# Patient Record
Sex: MALE | Race: WHITE | Employment: FULL TIME | URBAN - METROPOLITAN AREA
[De-identification: names, ages, dates, MRNs, and addresses within clinical notes are randomized per-mention and may not be internally consistent; named-entity substitution may affect disease eponyms.]

---

## 2017-03-12 DIAGNOSIS — L65.9 ALOPECIA: Primary | ICD-10-CM

## 2017-03-13 RX ORDER — FINASTERIDE 5 MG/1
TABLET, FILM COATED ORAL
Qty: 90 TABLET | Refills: 0 | Status: SHIPPED | OUTPATIENT
Start: 2017-03-13 | End: 2018-01-13

## 2017-07-18 ENCOUNTER — OFFICE VISIT (OUTPATIENT)
Dept: INTERNAL MEDICINE CLINIC | Facility: CLINIC | Age: 37
End: 2017-07-18

## 2017-07-18 ENCOUNTER — HOSPITAL ENCOUNTER (OUTPATIENT)
Dept: CT IMAGING | Age: 37
Discharge: HOME | End: 2017-07-18
Attending: INTERNAL MEDICINE
Payer: COMMERCIAL

## 2017-07-18 VITALS
TEMPERATURE: 98 F | HEART RATE: 68 BPM | DIASTOLIC BLOOD PRESSURE: 88 MMHG | OXYGEN SATURATION: 97 % | RESPIRATION RATE: 16 BRPM | SYSTOLIC BLOOD PRESSURE: 124 MMHG | WEIGHT: 208 LBS | BODY MASS INDEX: 28.17 KG/M2 | HEIGHT: 72 IN

## 2017-07-18 DIAGNOSIS — R10.31 ABDOMINAL PAIN, RLQ: Primary | ICD-10-CM

## 2017-07-18 DIAGNOSIS — N50.811 TESTICULAR PAIN, RIGHT: ICD-10-CM

## 2017-07-18 DIAGNOSIS — R10.31 ABDOMINAL PAIN, RLQ: ICD-10-CM

## 2017-07-18 PROCEDURE — 74176 CT ABD & PELVIS W/O CONTRAST: CPT | Performed by: INTERNAL MEDICINE

## 2017-07-18 PROCEDURE — 99214 OFFICE O/P EST MOD 30 MIN: CPT | Performed by: INTERNAL MEDICINE

## 2017-07-18 NOTE — PROGRESS NOTES
HPI:    Patient ID: Bety Rodriguez is a 44year old male. Abdominal Pain   This is a new problem. Episode onset: 3 weeks. The onset quality is sudden. The problem occurs constantly. The problem has been resolved. The pain is located in the RLQ.  The pain Abdominal: Soft. Normal appearance. He exhibits no shifting dullness, no distension, no pulsatile liver, no fluid wave, no abdominal bruit and no ascites. There is tenderness in the right lower quadrant.  There is guarding and tenderness at McBurney's point

## 2017-07-18 NOTE — PATIENT INSTRUCTIONS
Abdominal Pain    Abdominal pain is pain in the stomach or belly area. Everyone has this pain from time to time. In many cases it goes away on its own. But abdominal pain can sometimes be due to a serious problem, such as appendicitis.  So it’s important If you have vomiting or diarrhea, sip water or other clear fluids. When you are ready to eat solid foods again, start with small amounts of easy-to-digest, low-fat foods. These include apple sauce, toast, or crackers.    When to seek medical care  Call [phone]

## 2017-10-26 ENCOUNTER — OFFICE VISIT (OUTPATIENT)
Dept: INTERNAL MEDICINE CLINIC | Facility: CLINIC | Age: 37
End: 2017-10-26

## 2017-10-26 VITALS
HEIGHT: 72 IN | TEMPERATURE: 98 F | OXYGEN SATURATION: 95 % | SYSTOLIC BLOOD PRESSURE: 118 MMHG | DIASTOLIC BLOOD PRESSURE: 88 MMHG | BODY MASS INDEX: 28.71 KG/M2 | RESPIRATION RATE: 16 BRPM | WEIGHT: 212 LBS | HEART RATE: 77 BPM

## 2017-10-26 DIAGNOSIS — Z00.00 LABORATORY EXAMINATION ORDERED AS PART OF A ROUTINE GENERAL MEDICAL EXAMINATION: ICD-10-CM

## 2017-10-26 DIAGNOSIS — R41.840 POOR CONCENTRATION: ICD-10-CM

## 2017-10-26 DIAGNOSIS — Z13.0 SCREENING, ANEMIA, DEFICIENCY, IRON: ICD-10-CM

## 2017-10-26 DIAGNOSIS — Z13.29 THYROID DISORDER SCREEN: ICD-10-CM

## 2017-10-26 DIAGNOSIS — Z12.31 ENCOUNTER FOR SCREENING MAMMOGRAM FOR MALIGNANT NEOPLASM OF BREAST: ICD-10-CM

## 2017-10-26 DIAGNOSIS — Z13.220 LIPID SCREENING: ICD-10-CM

## 2017-10-26 DIAGNOSIS — Z13.89 SCREENING FOR GENITOURINARY CONDITION: ICD-10-CM

## 2017-10-26 DIAGNOSIS — Z00.00 PHYSICAL EXAM, ANNUAL: Primary | ICD-10-CM

## 2017-10-26 PROCEDURE — 99395 PREV VISIT EST AGE 18-39: CPT | Performed by: INTERNAL MEDICINE
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
## 2018-01-12 ENCOUNTER — HOSPITAL ENCOUNTER (OUTPATIENT)
Age: 38
Discharge: HOME | End: 2018-01-12
Attending: FAMILY MEDICINE
Payer: COMMERCIAL

## 2018-01-12 ENCOUNTER — APPOINTMENT (OUTPATIENT)
Dept: ULTRASOUND IMAGING | Facility: HOSPITAL | Age: 38
End: 2018-01-12
Attending: FAMILY MEDICINE
Payer: COMMERCIAL

## 2018-01-12 VITALS
WEIGHT: 206 LBS | HEIGHT: 72 IN | TEMPERATURE: 98 F | BODY MASS INDEX: 27.9 KG/M2 | RESPIRATION RATE: 18 BRPM | DIASTOLIC BLOOD PRESSURE: 92 MMHG | SYSTOLIC BLOOD PRESSURE: 140 MMHG | HEART RATE: 94 BPM | OXYGEN SATURATION: 98 %

## 2018-01-12 VITALS
TEMPERATURE: 98 F | SYSTOLIC BLOOD PRESSURE: 145 MMHG | DIASTOLIC BLOOD PRESSURE: 82 MMHG | RESPIRATION RATE: 18 BRPM | HEART RATE: 86 BPM | OXYGEN SATURATION: 96 %

## 2018-01-12 DIAGNOSIS — S89.90XA INJURY OF CALF: Primary | ICD-10-CM

## 2018-01-12 DIAGNOSIS — M25.571 ACUTE RIGHT ANKLE PAIN: ICD-10-CM

## 2018-01-12 DIAGNOSIS — S80.11XA CONTUSION OF RIGHT CALF, INITIAL ENCOUNTER: ICD-10-CM

## 2018-01-12 DIAGNOSIS — S86.011A ACHILLES TENDON RUPTURE, RIGHT, INITIAL ENCOUNTER: Primary | ICD-10-CM

## 2018-01-12 PROCEDURE — 76881 US COMPL JOINT R-T W/IMG: CPT | Performed by: FAMILY MEDICINE

## 2018-01-12 PROCEDURE — 99204 OFFICE O/P NEW MOD 45 MIN: CPT

## 2018-01-12 PROCEDURE — 99214 OFFICE O/P EST MOD 30 MIN: CPT

## 2018-01-12 PROCEDURE — 29515 APPLICATION SHORT LEG SPLINT: CPT

## 2018-01-12 RX ORDER — HYDROCODONE BITARTRATE AND ACETAMINOPHEN 5; 325 MG/1; MG/1
1-2 TABLET ORAL EVERY 6 HOURS PRN
Qty: 20 TABLET | Refills: 0 | Status: SHIPPED | OUTPATIENT
Start: 2018-01-12 | End: 2018-01-15

## 2018-01-12 RX ORDER — HYDROCODONE BITARTRATE AND ACETAMINOPHEN 5; 325 MG/1; MG/1
1 TABLET ORAL ONCE
Status: COMPLETED | OUTPATIENT
Start: 2018-01-12 | End: 2018-01-12

## 2018-01-12 RX ORDER — IBUPROFEN 800 MG/1
800 TABLET ORAL ONCE
Status: COMPLETED | OUTPATIENT
Start: 2018-01-12 | End: 2018-01-12

## 2018-01-12 NOTE — ED INITIAL ASSESSMENT (HPI)
Stepped into whole earlier today. Now cannot bear weight. To right  Foot, pain to achilles  Cms intact. Cydney Lloyd

## 2018-01-13 DIAGNOSIS — L65.9 ALOPECIA: ICD-10-CM

## 2018-01-13 NOTE — ED PROVIDER NOTES
Patient Seen in: [facility]    History   Patient presents with:  Lower Extremity Injury (musculoskeletal)    Stated Complaint: right leg injury     HPI  43 yo M here with complaints of a R leg injury   Cannot bear weight - motion: unable to dorsiflex and plantarflex   - joint laxity: none noted at this time because he is unable to bear weight on the RLE   - crepitus: none noted   - pedal pulses: Intact  - sensations: intact  - ankle joint: normal exam        ED Course   Labs results were available patient was called back in (we discussed this as part of the plan) - returned to get post mold applied and discussed case with Ortho on call Dr Makenzie Andujar who agrees to see him on Monday.            Disposition and Plan     Clinical Impres

## 2018-01-13 NOTE — ED PROVIDER NOTES
Reviewed US results with the patient. Discussed case with Dr Clyde Fleischer on call for Ortho. No MRI needed. No emergent evaluation / management needed. Follow up in 2-3 days on 1/15/2018 advised with post mold and crutches.      Splint applied by RN and yaron

## 2018-01-13 NOTE — ED INITIAL ASSESSMENT (HPI)
Pt arrived alert and responsive. Pt c/o ankle pain and injury. Ultrasound shows tear right achilles tendon. Pt returned here for splint placement.

## 2018-01-15 RX ORDER — FINASTERIDE 5 MG/1
TABLET, FILM COATED ORAL
Qty: 90 TABLET | Refills: 2 | Status: SHIPPED | OUTPATIENT
Start: 2018-01-15

## 2018-01-19 ENCOUNTER — ANESTHESIA (OUTPATIENT)
Dept: SURGERY | Facility: HOSPITAL | Age: 38
End: 2018-01-19
Payer: COMMERCIAL

## 2018-01-19 ENCOUNTER — SURGERY (OUTPATIENT)
Age: 38
End: 2018-01-19

## 2018-01-19 ENCOUNTER — HOSPITAL ENCOUNTER (OUTPATIENT)
Facility: HOSPITAL | Age: 38
Setting detail: HOSPITAL OUTPATIENT SURGERY
Discharge: HOME | End: 2018-01-19
Attending: ORTHOPAEDIC SURGERY | Admitting: ORTHOPAEDIC SURGERY
Payer: COMMERCIAL

## 2018-01-19 ENCOUNTER — ANESTHESIA EVENT (OUTPATIENT)
Dept: SURGERY | Facility: HOSPITAL | Age: 38
End: 2018-01-19
Payer: COMMERCIAL

## 2018-01-19 VITALS
DIASTOLIC BLOOD PRESSURE: 68 MMHG | HEART RATE: 62 BPM | WEIGHT: 206 LBS | OXYGEN SATURATION: 97 % | HEIGHT: 72 IN | RESPIRATION RATE: 18 BRPM | BODY MASS INDEX: 27.9 KG/M2 | SYSTOLIC BLOOD PRESSURE: 106 MMHG | TEMPERATURE: 98 F

## 2018-01-19 PROCEDURE — 0LQN0ZZ REPAIR RIGHT LOWER LEG TENDON, OPEN APPROACH: ICD-10-PCS | Performed by: ORTHOPAEDIC SURGERY

## 2018-01-19 PROCEDURE — 76942 ECHO GUIDE FOR BIOPSY: CPT | Performed by: ORTHOPAEDIC SURGERY

## 2018-01-19 RX ORDER — HYDROCODONE BITARTRATE AND ACETAMINOPHEN 10; 325 MG/1; MG/1
1 TABLET ORAL AS NEEDED
Status: DISCONTINUED | OUTPATIENT
Start: 2018-01-19 | End: 2018-01-19

## 2018-01-19 RX ORDER — SODIUM CHLORIDE, SODIUM LACTATE, POTASSIUM CHLORIDE, CALCIUM CHLORIDE 600; 310; 30; 20 MG/100ML; MG/100ML; MG/100ML; MG/100ML
INJECTION, SOLUTION INTRAVENOUS CONTINUOUS
Status: DISCONTINUED | OUTPATIENT
Start: 2018-01-19 | End: 2018-01-19

## 2018-01-19 RX ORDER — MEPERIDINE HYDROCHLORIDE 25 MG/ML
12.5 INJECTION INTRAMUSCULAR; INTRAVENOUS; SUBCUTANEOUS AS NEEDED
Status: DISCONTINUED | OUTPATIENT
Start: 2018-01-19 | End: 2018-01-19

## 2018-01-19 RX ORDER — HYDROCODONE BITARTRATE AND ACETAMINOPHEN 10; 325 MG/1; MG/1
1-2 TABLET ORAL
Qty: 80 TABLET | Refills: 0 | Status: SHIPPED | OUTPATIENT
Start: 2018-01-19

## 2018-01-19 RX ORDER — TRAMADOL HYDROCHLORIDE 50 MG/1
100 TABLET ORAL EVERY 6 HOURS PRN
COMMUNITY

## 2018-01-19 RX ORDER — SCOLOPAMINE TRANSDERMAL SYSTEM 1 MG/1
PATCH, EXTENDED RELEASE TRANSDERMAL
Status: DISCONTINUED
Start: 2018-01-19 | End: 2018-01-19

## 2018-01-19 RX ORDER — ONDANSETRON 2 MG/ML
4 INJECTION INTRAMUSCULAR; INTRAVENOUS AS NEEDED
Status: DISCONTINUED | OUTPATIENT
Start: 2018-01-19 | End: 2018-01-19

## 2018-01-19 RX ORDER — HYDROMORPHONE HYDROCHLORIDE 1 MG/ML
0.4 INJECTION, SOLUTION INTRAMUSCULAR; INTRAVENOUS; SUBCUTANEOUS EVERY 5 MIN PRN
Status: DISCONTINUED | OUTPATIENT
Start: 2018-01-19 | End: 2018-01-19

## 2018-01-19 RX ORDER — CLINDAMYCIN PHOSPHATE 900 MG/50ML
900 INJECTION INTRAVENOUS ONCE
Status: DISCONTINUED | OUTPATIENT
Start: 2018-01-19 | End: 2018-01-19 | Stop reason: HOSPADM

## 2018-01-19 RX ORDER — CLINDAMYCIN PHOSPHATE 900 MG/50ML
INJECTION INTRAVENOUS
Status: DISCONTINUED | OUTPATIENT
Start: 2018-01-19 | End: 2018-01-19

## 2018-01-19 RX ORDER — METOCLOPRAMIDE HYDROCHLORIDE 5 MG/ML
10 INJECTION INTRAMUSCULAR; INTRAVENOUS AS NEEDED
Status: DISCONTINUED | OUTPATIENT
Start: 2018-01-19 | End: 2018-01-19

## 2018-01-19 RX ORDER — NALOXONE HYDROCHLORIDE 0.4 MG/ML
80 INJECTION, SOLUTION INTRAMUSCULAR; INTRAVENOUS; SUBCUTANEOUS AS NEEDED
Status: DISCONTINUED | OUTPATIENT
Start: 2018-01-19 | End: 2018-01-19

## 2018-01-19 RX ORDER — SCOLOPAMINE TRANSDERMAL SYSTEM 1 MG/1
1 PATCH, EXTENDED RELEASE TRANSDERMAL ONCE
Status: DISCONTINUED | OUTPATIENT
Start: 2018-01-19 | End: 2018-01-19

## 2018-01-19 RX ORDER — HYDROCODONE BITARTRATE AND ACETAMINOPHEN 10; 325 MG/1; MG/1
2 TABLET ORAL AS NEEDED
Status: DISCONTINUED | OUTPATIENT
Start: 2018-01-19 | End: 2018-01-19

## 2018-01-19 NOTE — H&P
[address]    PATIENT'S NAME: Raymon Connell   ATTENDING PHYSICIAN: Florencia Gonsales M.D.    PATIENT ACCOUNT#:   [de-identified]    LOCATION:  OR   [facility]  MEDICAL RECORD #:   [id number]       YOB: 1980  ADMISSION DATE:       01/19/2018    HISTOR blood pressure 132/90. HEENT:  Unremarkable. LUNGS:  Clear. HEART:  Normal S1, S2 without murmur. ABDOMEN:  Benign.   EXTREMITIES:  Significant for an obvious Franco test which is positive with a palpable gap in the area of the Achilles tendon on the

## 2018-01-19 NOTE — INTERVAL H&P NOTE
Pre-op Diagnosis: RUPTURE OF RIGHT ACHILLES TENDON     The above referenced H&P was reviewed by Sancho Jorge NP on 1/19/2018, the patient was examined and no significant changes have occurred in the patient's condition since the H&P was performed.   I dis

## 2018-01-19 NOTE — ANESTHESIA PREPROCEDURE EVALUATION
PRE-OP EVALUATION    Patient Name: Bety Rodriguez    Pre-op Diagnosis: RUPTURE OF RIGHT ACHILLES TENDON     Procedure(s):  RIGHT ACHILLES TENDON REPAIR      Surgeon(s) and Role:     Carla Torrez MD - Primary    Pre-op vitals reviewed.   Temp: 97.6 °F (3 auscultation bilaterally. Other findings            ASA: 2   Plan: general and regional  NPO status verified and patient meets guidelines.           Plan/risks discussed with: patient and spouse                Present on Admission:  **None**

## 2018-01-19 NOTE — BRIEF OP NOTE
Pre-Operative Diagnosis: RUPTURE OF RIGHT ACHILLES TENDON      Post-Operative Diagnosis: same as pre-op     Procedure Performed:   Procedure(s):  RIGHT ACHILLES TENDON REPAIR      Surgeon(s) and Role:     Josephine Lau MD - Primary    Assistant(s):

## 2018-01-20 NOTE — ANESTHESIA POSTPROCEDURE EVALUATION
[address] [facility] Patient Status:  Hospital Outpatient Surgery   Age/Gender 44year old male MRN [id number]   [facility] SURGERY Attending Jennifer Lantigua MD   Hosp Day # 0 PCP Stormy Rivers MD       Anesthesia Post-op Note    P

## 2018-01-21 PROBLEM — S86.011A RUPTURE OF RIGHT ACHILLES TENDON, INITIAL ENCOUNTER: Status: ACTIVE | Noted: 2018-01-21

## 2018-01-21 NOTE — OPERATIVE REPORT
[facility]    PATIENT'S NAME: Steve Hoffman   ATTENDING PHYSICIAN: Dyllan Chanel M.D. OPERATING PHYSICIAN: Dyllan Chanel M.D.    PATIENT ACCOUNT#:   [de-identified]    LOCATION:  PREOP[facility]CC  PRE ASCC 1 EDWP 10  MEDICAL RECORD #:   [id number]       DATE with #1 Ethibond suture. An excellent watertight seal was achieved. The wound was irrigated. Deep tissues were closed with 2-0 Vicryl suture. Skin was closed with staples. Adaptic and a sterile dressing were applied.   A short leg equinus cast was appl

## 2018-01-29 PROBLEM — Z09 POSTOPERATIVE EXAMINATION: Status: ACTIVE | Noted: 2018-01-29

## 2018-03-12 ENCOUNTER — HOSPITAL ENCOUNTER (OUTPATIENT)
Dept: PHYSICAL THERAPY | Facility: HOSPITAL | Age: 38
Setting detail: THERAPIES SERIES
Discharge: HOME | End: 2018-03-12
Attending: ORTHOPAEDIC SURGERY
Payer: COMMERCIAL

## 2018-03-12 DIAGNOSIS — Z09 POSTOPERATIVE EXAMINATION: ICD-10-CM

## 2018-03-12 DIAGNOSIS — Z98.890 S/P ACHILLES TENDON REPAIR: ICD-10-CM

## 2018-03-12 PROCEDURE — 97161 PT EVAL LOW COMPLEX 20 MIN: CPT

## 2018-03-12 PROCEDURE — 97110 THERAPEUTIC EXERCISES: CPT

## 2018-03-12 NOTE — PROGRESS NOTES
LOWER EXTREMITY EVALUATION:   Referring Physician: Dr. Clyde Fleischer  Diagnosis: Achilles tendon rupture and repair     Date of Service: 3/12/2018     PATIENT SUMMARY   Ingrid Abrams is a 43year old male who presents to therapy today s/p achilles tendon repair o Complexity decision making due to 1+ personal factors/comorbidities, 3+ body structures involved/activity limitations, and stable symptoms.   Prognosis for therapy is good as patient readily participates in physical therapy eval and is highly motivated to r L 4+/5   Flexion: R 5/5; L 5/5  Extension: R 5/5; L 5/5    DF: R NT/5; L 5/5  PF: R NT/5; L 5/5 (completes 25 heel raises)  INV: R NT/5; L 5/5  EV: R NT/5; L 5/5   *will assess R ankle/foot strength when apprpriate    Today’s Treatment and Response:   Eval None  Rehab Potential:good    FOTO: 53/100    Patient/Family/Caregiver was advised of these findings, precautions, and treatment options and has agreed to actively participate in planning and for this course of care.     Thank you for your referral. Please

## 2018-03-16 ENCOUNTER — HOSPITAL ENCOUNTER (OUTPATIENT)
Dept: PHYSICAL THERAPY | Facility: HOSPITAL | Age: 38
Setting detail: THERAPIES SERIES
Discharge: HOME | End: 2018-03-16
Attending: ORTHOPAEDIC SURGERY
Payer: COMMERCIAL

## 2018-03-16 PROCEDURE — 97140 MANUAL THERAPY 1/> REGIONS: CPT

## 2018-03-16 PROCEDURE — 97110 THERAPEUTIC EXERCISES: CPT

## 2018-03-16 NOTE — PROGRESS NOTES
Dx: s/p achilles tendon repair         Authorized # of Visits:  12         Next MD visit: none scheduled  Fall Risk: standard         Precautions: n/a             Subjective: Patient states he is still experiencing pain right before bed but it hasn't been 3/   Date:               TX#: 4/ Date:               TX#: 5/ Date:               TX#: 6/ Date:               TX#: 7/ Date:               TX#: 8/   Upright bike L2 x 5 min         Passive self DF stretch 4 x 30s         RTB  -Eversion 10  -Inversion 2 x 10

## 2018-03-16 NOTE — ADDENDUM NOTE
Encounter addended by: Philmore Boas, PT on: 3/16/2018  9:49 AM<BR>    Actions taken: Pend clinical note, Sign clinical note

## 2018-03-19 ENCOUNTER — HOSPITAL ENCOUNTER (OUTPATIENT)
Dept: PHYSICAL THERAPY | Facility: HOSPITAL | Age: 38
Setting detail: THERAPIES SERIES
Discharge: HOME | End: 2018-03-19
Attending: ORTHOPAEDIC SURGERY
Payer: COMMERCIAL

## 2018-03-19 PROCEDURE — 97140 MANUAL THERAPY 1/> REGIONS: CPT

## 2018-03-19 PROCEDURE — 97530 THERAPEUTIC ACTIVITIES: CPT

## 2018-03-19 NOTE — PROGRESS NOTES
Dx: s/p achilles tendon repair         Authorized # of Visits:  12         Next MD visit: none scheduled  Fall Risk: standard         Precautions: n/a             Subjective: Patient reports that he is not experiencing any pain other than itching around th performing light jogging, squatting, side shuffling as indicated by MD restrictions for eventual return to sport. -progress  · Pt will be independent and compliant with comprehensive HEP tgo maintain progress achieved in Pt. -issued    Plan: Continue addre

## 2018-03-20 NOTE — ADDENDUM NOTE
Encounter addended by: Young Rudd PT on: 3/19/2018  8:53 PM<BR>    Actions taken: Sign clinical note

## 2018-03-21 ENCOUNTER — HOSPITAL ENCOUNTER (OUTPATIENT)
Dept: PHYSICAL THERAPY | Facility: HOSPITAL | Age: 38
Setting detail: THERAPIES SERIES
Discharge: HOME | End: 2018-03-21
Attending: ORTHOPAEDIC SURGERY
Payer: COMMERCIAL

## 2018-03-21 PROCEDURE — 97140 MANUAL THERAPY 1/> REGIONS: CPT

## 2018-03-21 PROCEDURE — 97110 THERAPEUTIC EXERCISES: CPT

## 2018-03-21 NOTE — PROGRESS NOTES
Dx: s/p achilles tendon repair         Authorized # of Visits:  12         Next MD visit: none scheduled  Fall Risk: standard         Precautions: n/a             Subjective: Patient reports feeling really good and 'almost fully healed'.  He hasn't been exp -progress  · Pt will be independent and compliant with comprehensive HEP tgo maintain progress achieved in Pt. -issued    Plan: Continue addressing joint hypomobility, progress therex and stretching as indicated.     Date: 3/16/2018 TX#: 2/12 Date: 3/19/18 ankle ABC's, ankle pumps, prone glue max, side lying hip abduction, towel toe curls    Charges: manual x 1, therex x 2       Total Timed Treatment: 45 min  Total Treatment Time: 45 min

## 2018-03-26 ENCOUNTER — APPOINTMENT (OUTPATIENT)
Dept: PHYSICAL THERAPY | Facility: HOSPITAL | Age: 38
End: 2018-03-26
Attending: ORTHOPAEDIC SURGERY
Payer: COMMERCIAL

## 2018-03-30 ENCOUNTER — APPOINTMENT (OUTPATIENT)
Dept: PHYSICAL THERAPY | Facility: HOSPITAL | Age: 38
End: 2018-03-30
Attending: ORTHOPAEDIC SURGERY
Payer: COMMERCIAL

## 2018-04-02 ENCOUNTER — HOSPITAL ENCOUNTER (OUTPATIENT)
Dept: PHYSICAL THERAPY | Facility: HOSPITAL | Age: 38
Setting detail: THERAPIES SERIES
Discharge: HOME | End: 2018-04-02
Attending: ORTHOPAEDIC SURGERY
Payer: COMMERCIAL

## 2018-04-02 PROCEDURE — 97140 MANUAL THERAPY 1/> REGIONS: CPT

## 2018-04-02 PROCEDURE — 97110 THERAPEUTIC EXERCISES: CPT

## 2018-04-02 NOTE — PROGRESS NOTES
Dx: s/p achilles tendon repair         Authorized # of Visits:  12         Next MD visit: none scheduled  Fall Risk: standard         Precautions: n/a             Subjective: Patient reports that MD removed all restrictions other than jumping and running ( after next session. Issue comprehensive updated HEP.   Date: 3/16/2018 TX#: 2/12 Date: 3/19/18    TX#: 3/12   Date: 3/21/18    TX#: 4/12 Date: 4/2/18      TX#: 5/12 Date:               TX#: 6/ Date:               TX#: 7/ Date:               TX#: Kp/   Alida Pruitt distraction, grade III  Passive gastroc stretch  Scar mobs x 5 min      Skilled Services: Education on protecting integrity of tendon and surgery, therex to improve strength and A/PROM deficits as tolorated and appropriate, manual therapy to improve arthro

## 2018-04-03 NOTE — ADDENDUM NOTE
Encounter addended by: Nicole Pugh PT on: 4/3/2018  9:06 AM<BR>    Actions taken: Pend clinical note, Sign clinical note

## 2018-04-04 ENCOUNTER — HOSPITAL ENCOUNTER (OUTPATIENT)
Dept: PHYSICAL THERAPY | Facility: HOSPITAL | Age: 38
Setting detail: THERAPIES SERIES
Discharge: HOME | End: 2018-04-04
Attending: ORTHOPAEDIC SURGERY
Payer: COMMERCIAL

## 2018-04-04 PROCEDURE — 97140 MANUAL THERAPY 1/> REGIONS: CPT

## 2018-04-04 PROCEDURE — 97110 THERAPEUTIC EXERCISES: CPT

## 2018-04-04 NOTE — PROGRESS NOTES
Dx: s/p achilles tendon repair         Authorized # of Visits:  12         Next MD visit: none scheduled  Fall Risk: standard         Precautions: n/a             Subjective: Patient reports  No c/o pain and swelling had decreased since last visit.   Santos from PT x 3 weeks with continued independent HEP, will follow-up at that time (April 23). Continue addressing joint hypomobility with mobs and stretching. Progress therex as appropriate. Assess goals, ROM, strength and update HEP at follow up.    Date: 3/16 difficulty)  -85-90% WB heel raise R x 10 -85-90% WB heel raise R x 20  -double heel rise with R eccentric lowering x 8 (max difficulty)     -med/lat subtalar mobs, grade II/III  -ant/post talocrural mobs, grade II/III  -scar mobs x 3 min -med/lat subtalar

## 2018-04-23 ENCOUNTER — HOSPITAL ENCOUNTER (OUTPATIENT)
Dept: PHYSICAL THERAPY | Facility: HOSPITAL | Age: 38
Setting detail: THERAPIES SERIES
Discharge: HOME | End: 2018-04-23
Attending: ORTHOPAEDIC SURGERY
Payer: COMMERCIAL

## 2018-04-23 PROCEDURE — 97140 MANUAL THERAPY 1/> REGIONS: CPT

## 2018-04-23 PROCEDURE — 97110 THERAPEUTIC EXERCISES: CPT

## 2018-04-23 NOTE — PROGRESS NOTES
Dx: s/p achilles tendon repair         Authorized # of Visits:  12         Next MD visit: none scheduled  Fall Risk: standard         Precautions: n/a    Subjective: Patient reports minimal changes since last therapy visit.   Patient reports that he has not Progress PF strength as indicated and appropriate.    Date: 3/16/2018 TX#: 2/12 Date: 3/19/18    TX#: 3/12   Date: 3/21/18    TX#: 4/12 Date: 4/2/18      TX#: 5/12 Date:               TX#: 6/12 Date: 4/23/18     TX#: 7/12 Date:               TX#: Marisol Spangler (max difficulty)  -85-90% WB heel raise R x 10 -85-90% WB heel raise R x 20  -double heel rise with R eccentric lowering x 8 (max difficulty) -double heel rise with R eccentric lowering 2 x 10 (improved control)    -med/lat subtalar mobs, grade II/III  -an

## 2018-05-07 ENCOUNTER — APPOINTMENT (OUTPATIENT)
Dept: PHYSICAL THERAPY | Facility: HOSPITAL | Age: 38
End: 2018-05-07
Attending: ORTHOPAEDIC SURGERY
Payer: COMMERCIAL

## 2018-05-14 ENCOUNTER — APPOINTMENT (OUTPATIENT)
Dept: PHYSICAL THERAPY | Facility: HOSPITAL | Age: 38
End: 2018-05-14
Attending: ORTHOPAEDIC SURGERY
Payer: COMMERCIAL

## 2018-05-14 ENCOUNTER — HOSPITAL ENCOUNTER (OUTPATIENT)
Dept: PHYSICAL THERAPY | Facility: HOSPITAL | Age: 38
Setting detail: THERAPIES SERIES
Discharge: HOME | End: 2018-05-14
Attending: ORTHOPAEDIC SURGERY
Payer: COMMERCIAL

## 2018-05-14 PROCEDURE — 97140 MANUAL THERAPY 1/> REGIONS: CPT

## 2018-05-14 PROCEDURE — 97110 THERAPEUTIC EXERCISES: CPT

## 2018-05-14 PROCEDURE — 97112 NEUROMUSCULAR REEDUCATION: CPT

## 2018-05-14 NOTE — ADDENDUM NOTE
Encounter addended by: Alma Coy PT on: 5/14/2018  5:12 PM<BR>    Actions taken: Charge Capture section accepted

## 2018-05-14 NOTE — PROGRESS NOTES
Dx: s/p achilles tendon repair         Authorized # of Visits:  12         Next MD visit: none scheduled  Fall Risk: standard         Precautions: n/a    Subjective: Patient reports that he tried to be much more compliant with his exercises this past week, tgo maintain progress achieved in Pt. -issued    Plan: Patient to continue PT 1x/week to ensure no regression, increase in pain, and progressing SL HR as appropriate. He is agreeable to this plan.    Date: 3/16/2018 TX#: 2/12 Date: 3/19/18    TX#: 3/12   Jared 35s  -functional TCJ mobs w/ therapist OP x 10 -standing PF stretch 3 x 30s  Mini trampoline  - fwd march jog 2 x 1 min  - lateral march jog 2 x 1 min   - Shuttle  -DLS, 24# x 20  -SLS, 37# x 20 Shuttle  -DLS, 44# x 25  -SLS, 37# x 20 -resisted walking wit focus on short foot and 75% HR; return to gym for bike, elliptical, hip strengthening, HR on shuttle    Charges: Manual x 1, Therex x 1, NR x 1       Total Timed Treatment: 45 min  Total Treatment Time: 45 min

## 2018-05-23 ENCOUNTER — HOSPITAL ENCOUNTER (OUTPATIENT)
Dept: PHYSICAL THERAPY | Facility: HOSPITAL | Age: 38
Setting detail: THERAPIES SERIES
Discharge: HOME | End: 2018-05-23
Attending: ORTHOPAEDIC SURGERY
Payer: COMMERCIAL

## 2018-05-23 PROCEDURE — 97110 THERAPEUTIC EXERCISES: CPT

## 2018-05-23 PROCEDURE — 97112 NEUROMUSCULAR REEDUCATION: CPT

## 2018-05-23 PROCEDURE — 97140 MANUAL THERAPY 1/> REGIONS: CPT

## 2018-05-23 NOTE — PROGRESS NOTES
Dx: s/p achilles tendon repair         Authorized # of Visits:  12         Next MD visit: none scheduled  Fall Risk: standard         Precautions: n/a    Subjective: Patient reports that he has been feeling markedly better since last visit.  He has not had HR as appropriate. Follow-up on golf outing/any issues. Discuss POC, continuation or D/C.    Date: 3/16/2018 TX#: 2/12 Date: 3/19/18    TX#: 3/12   Date: 3/21/18    TX#: 4/12 Date: 4/2/18      TX#: 5/12 Date:               TX#: 6/12 Date: 4/23/18     TX#: 7 x 4 laps  -Wall squat with SB, 2 x 10 -standing gastroc stretch 2 x 30s  -standing soleus stretch 2 x 30s -standing gastroc stretch on step 3 x 30s  -functional TCJ mobs w/ therapist OP x 10 -standing PF stretch 3 x 30s  Mini trampoline  - fwd march jog 2 [facility] plantar aspect foot      Discussed/edu POC and HEP x 3 min Manual therapy x 15 min  - STM plantar foot, gastroc  - intertarsal mobs/glides  - TCJ glides/mobs gr III  - IAS plantar aspect foot   Skilled Services: Manual therapy to improve arthrokine

## 2018-05-30 ENCOUNTER — APPOINTMENT (OUTPATIENT)
Dept: PHYSICAL THERAPY | Facility: HOSPITAL | Age: 38
End: 2018-05-30
Attending: ORTHOPAEDIC SURGERY
Payer: COMMERCIAL

## 2018-06-04 ENCOUNTER — HOSPITAL ENCOUNTER (OUTPATIENT)
Dept: PHYSICAL THERAPY | Facility: HOSPITAL | Age: 38
Setting detail: THERAPIES SERIES
Discharge: HOME | End: 2018-06-04
Attending: ORTHOPAEDIC SURGERY
Payer: COMMERCIAL

## 2018-06-04 PROCEDURE — 97110 THERAPEUTIC EXERCISES: CPT

## 2018-06-04 PROCEDURE — 97140 MANUAL THERAPY 1/> REGIONS: CPT

## 2018-06-04 NOTE — PROGRESS NOTES
Progress/Discharge Summary  Dx: s/p achilles tendon repair         Authorized # of Visits:  12         Next MD visit: none scheduled  Fall Risk: standard         Precautions: n/a  Pt has attended 8, cancelled 2, and no shown 0 visits in Physical Therapy. increase in ankle and hip strategy to maintain. This is similar on L. He is understanding of his remaining deficits and has been much more compliant over the past month.  Advised continue to strengthen within higher level SLS balance activities, SL HR loadi notices return of symptoms or regression in that time, may return to PT as needed. If he continues to feel well, will consider this date formal D/C from PT.        Patient/Family/Caregiver was advised of these findings, precautions, and treatment options an hop x 10 R/L -SLS tapping onto 4 targets x 10 -SLS tapping onto 4 targets x 10 -SLS tapping onto 4 targets x 10  -SLS squat with 1st ray PF x 10 RTB side step with band around toes x 3 laps // bars RTB side step with band around toes x 3 laps // bars Incre talocrural, grade II/III  -talocrural distraction, grade III  Passive gastroc stretch  Scar mobs x 2 min  -STM to gastroc x 5 min Mobilizations x 8 min  -posterior TCJ grade III  -anterior TCJ, grade IV    Passive gastroc stretch 2 x 30s  Goals assessment

## (undated) DEVICE — CAST TAPE SYNTH 6

## (undated) DEVICE — SUTURE VICRYL 0 CP-1

## (undated) DEVICE — PADDING CAST SOFT ROLL 4\"

## (undated) DEVICE — SUTURE VICRYL 2-0 FSL

## (undated) DEVICE — CHLORAPREP 26ML APPLICATOR

## (undated) DEVICE — SUTURE NABSB OTHCRD 2 MO-7

## (undated) DEVICE — STERILE POLYISOPRENE POWDER-FREE SURGICAL GLOVES: Brand: PROTEXIS

## (undated) DEVICE — SOL  .9 1000ML BTL

## (undated) DEVICE — NEEDLE ANCHOR 1827-2 7/8

## (undated) DEVICE — LOWER EXTREMITY CDS-LF: Brand: MEDLINE INDUSTRIES, INC.

## (undated) DEVICE — PAD ULNAR NERVE PROTECTOR

## (undated) DEVICE — NON-ADHERENT STRIPS,OIL EMULSION: Brand: CURITY

## (undated) DEVICE — SUTURE ETHIBOND 1 OS-6

## (undated) DEVICE — KENDALL SCD EXPRESS SLEEVES, KNEE LENGTH, MEDIUM: Brand: KENDALL SCD

## (undated) DEVICE — PROXIMATE SKIN STAPLERS (35 WIDE) CONTAINS 35 STAINLESS STEEL STAPLES (FIXED HEAD): Brand: PROXIMATE

## (undated) DEVICE — GLOVE SURG SENSICARE SZ 8-1/2

## (undated) DEVICE — GOWN,SIRUS,FABRIC-REINFORCED,LARGE: Brand: MEDLINE

## (undated) DEVICE — 3M™ STERI-DRAPE™ U-DRAPE 1015: Brand: STERI-DRAPE™

## (undated) NOTE — LETTER
Patient Name: Gracie Barber  YOB: 1980          MRN number:  [id number]  Date:  6/4/2018  Referring Physician:  Laura Ross     Progress/Discharge Summary  Dx: s/p achilles tendon repair         Authorized # of Visits:  15         Next MD vi compared to previous visits. Static balance good, able to perform SLS >30 sec, however, higher level activities such as SLS on compliant surfaces results in marked increase in ankle and hip strategy to maintain. This is similar on L.  He is understanding of Rehab Potential: good - based on goals as listed above  FOTO: 72/100 (19 points of physical change since evaluation)    Plan: Patient will hold from PT with continued independent HEP x 4-6 weeks.  If he notices return of symptoms or regression in that time,